# Patient Record
Sex: FEMALE | Race: WHITE | NOT HISPANIC OR LATINO | URBAN - METROPOLITAN AREA
[De-identification: names, ages, dates, MRNs, and addresses within clinical notes are randomized per-mention and may not be internally consistent; named-entity substitution may affect disease eponyms.]

---

## 2024-01-23 ENCOUNTER — OFFICE VISIT (OUTPATIENT)
Dept: URGENT CARE | Facility: CLINIC | Age: 39
End: 2024-01-23
Payer: COMMERCIAL

## 2024-01-23 ENCOUNTER — APPOINTMENT (OUTPATIENT)
Dept: RADIOLOGY | Facility: CLINIC | Age: 39
End: 2024-01-23
Payer: COMMERCIAL

## 2024-01-23 VITALS — TEMPERATURE: 97.2 F | WEIGHT: 132 LBS | HEART RATE: 95 BPM | OXYGEN SATURATION: 100 % | RESPIRATION RATE: 12 BRPM

## 2024-01-23 DIAGNOSIS — S39.92XA TAILBONE INJURY, INITIAL ENCOUNTER: ICD-10-CM

## 2024-01-23 DIAGNOSIS — S39.92XA TAILBONE INJURY, INITIAL ENCOUNTER: Primary | ICD-10-CM

## 2024-01-23 PROCEDURE — 72220 X-RAY EXAM SACRUM TAILBONE: CPT

## 2024-01-23 PROCEDURE — 99213 OFFICE O/P EST LOW 20 MIN: CPT | Performed by: PREVENTIVE MEDICINE

## 2024-01-23 RX ORDER — ESCITALOPRAM OXALATE 20 MG/1
TABLET ORAL
COMMUNITY
Start: 2023-10-23

## 2024-01-23 RX ORDER — OMEPRAZOLE 20 MG/1
20 CAPSULE, DELAYED RELEASE ORAL DAILY
COMMUNITY

## 2024-01-23 NOTE — PROGRESS NOTES
[facility]        NAME: Kasia Barker is a 38 y.o. female  : 1985    MRN: [id number]  DATE: 2024  TIME: 10:07 AM    Assessment and Plan   Tailbone injury, initial encounter [S39.92XA]  1. Tailbone injury, initial encounter  XR sacrum and coccyx            Patient Instructions       Follow up with PCP in 3-5 days.  Proceed to  ER if symptoms worsen.    Chief Complaint     Chief Complaint   Patient presents with    Tailbone Pain     Pt presents with tailbone injury two weeks ago r/t slipping on steps         History of Present Illness       She fell 10 days ago landing on her tailbone.  It is still painful particularly when she walks or stands.        Review of Systems   Review of Systems   Musculoskeletal:  Positive for arthralgias.         Current Medications       Current Outpatient Medications:     escitalopram (LEXAPRO) 20 mg tablet, , Disp: , Rfl:     omeprazole (PriLOSEC) 20 mg delayed release capsule, Take 20 mg by mouth daily, Disp: , Rfl:     Current Allergies     Allergies as of 2024    (No Known Allergies)            The following portions of the patient's history were reviewed and updated as appropriate: allergies, current medications, past family history, past medical history, past social history, past surgical history and problem list.     Past Medical History:   Diagnosis Date    GERD (gastroesophageal reflux disease)     Panic disorder        Past Surgical History:   Procedure Laterality Date    ADENOIDECTOMY      ASD REPAIR N/A     DILATION AND CURETTAGE OF UTERUS N/A     EYE SURGERY      TONSILLECTOMY         History reviewed. No pertinent family history.      Medications have been verified.        Objective   Pulse 95   Temp (!) 97.2 °F (36.2 °C)   Resp 12   Wt 59.9 kg (132 lb)   LMP 2024 (Exact Date)   SpO2 100%   Patient's last menstrual period was 2024 (exact date).       Physical Exam     Physical Exam  Musculoskeletal:      Comments:  Tenderness over the coccyx area.     X-ray reveals no acute changes

## 2024-05-29 ENCOUNTER — OFFICE VISIT (OUTPATIENT)
Dept: URGENT CARE | Facility: CLINIC | Age: 39
End: 2024-05-29
Payer: COMMERCIAL

## 2024-05-29 VITALS
SYSTOLIC BLOOD PRESSURE: 116 MMHG | OXYGEN SATURATION: 98 % | HEART RATE: 90 BPM | HEIGHT: 69 IN | TEMPERATURE: 97.5 F | BODY MASS INDEX: 21.92 KG/M2 | WEIGHT: 148 LBS | RESPIRATION RATE: 18 BRPM | DIASTOLIC BLOOD PRESSURE: 76 MMHG

## 2024-05-29 DIAGNOSIS — L03.012 ACUTE PARONYCHIA OF FINGER OF LEFT HAND: Primary | ICD-10-CM

## 2024-05-29 PROCEDURE — 99213 OFFICE O/P EST LOW 20 MIN: CPT | Performed by: FAMILY MEDICINE

## 2024-05-29 RX ORDER — AMOXICILLIN AND CLAVULANATE POTASSIUM 875; 125 MG/1; MG/1
1 TABLET, FILM COATED ORAL EVERY 12 HOURS SCHEDULED
Qty: 14 TABLET | Refills: 0 | Status: SHIPPED | OUTPATIENT
Start: 2024-05-29 | End: 2024-06-05

## 2024-05-29 NOTE — PROGRESS NOTES
[facility]        NAME: Kasia Barker is a 39 y.o. female  : 1985    MRN: [id number]  DATE: May 29, 2024  TIME: 9:47 AM    Assessment and Plan   Acute paronychia of finger of left hand [L03.012]  1. Acute paronychia of finger of left hand  amoxicillin-clavulanate (AUGMENTIN) 875-125 mg per tablet        Patient Instructions     Patient Instructions   Augmentin x 7 days prescribed, complete as directed.  Take Tylenol or Motrin as needed for pain.   Gently wash the skin with soap and water daily, keep clean.   Apply topical Neosporin ointment to the site and cover w/ bandaid.  Advised warm water soaks 1-2 times per day.  If symptoms persist despite treatment, follow up w/ PCP office for re-check.    Follow up with PCP in 3-5 days.  Proceed to  ER if symptoms worsen.    If tests have been performed at [facility], our office will contact you with results if changes need to be made to the care plan discussed with you at the visit.  You can review your full results on [facility] MyChart.    Chief Complaint     Chief Complaint   Patient presents with    Hand Pain     Left index finger pain and redness around the nail x 1 wk.     History of Present Illness     40 yo female presents c/o pain, swelling, and redness of the skin surrounding the nail on the L index finger. Symptoms have been present for the past 1 week. Patient states the area has drained pus intermittently over the past few days. There is no active drainage at this time. No fevers. No bone or joint tenderness. Patient is able to move the finger in full ROM w/o any pain. No numbness/tingling or weakness of the hand. No red streaking noted along the hand or arm. Patient has taken Ibuprofen and applied tea tree oil to the sites. She has no known allergies. She denies any chance of pregnancy at this time.      Review of Systems   Review of Systems   Constitutional: Negative.    Musculoskeletal: Negative.    Skin:         As noted in HPI  "  Allergic/Immunologic: Negative.    Hematological: Negative.          Current Medications       Current Outpatient Medications:     amoxicillin-clavulanate (AUGMENTIN) 875-125 mg per tablet, Take 1 tablet by mouth every 12 (twelve) hours for 7 days, Disp: 14 tablet, Rfl: 0    escitalopram (LEXAPRO) 20 mg tablet, , Disp: , Rfl:     omeprazole (PriLOSEC) 20 mg delayed release capsule, Take 20 mg by mouth daily, Disp: , Rfl:     Current Allergies     Allergies as of 05/29/2024    (No Known Allergies)            The following portions of the patient's history were reviewed and updated as appropriate: allergies, current medications, past family history, past medical history, past social history, past surgical history and problem list.     Past Medical History:   Diagnosis Date    GERD (gastroesophageal reflux disease)     Panic disorder        Past Surgical History:   Procedure Laterality Date    ADENOIDECTOMY      ASD REPAIR N/A     DILATION AND CURETTAGE OF UTERUS N/A     EYE SURGERY      TONSILLECTOMY         History reviewed. No pertinent family history.      Medications have been verified.        Objective   /76   Pulse 90   Temp 97.5 °F (36.4 °C)   Resp 18   Ht 5' 9\" (1.753 m)   Wt 67.1 kg (148 lb)   SpO2 98%   BMI 21.86 kg/m²   No LMP recorded. Patient has had an ablation.       Physical Exam     Physical Exam  Vitals and nursing note reviewed.   Constitutional:       General: She is awake. She is not in acute distress.     Appearance: Normal appearance. She is well-developed and well-groomed. She is not ill-appearing, toxic-appearing or diaphoretic.   Cardiovascular:      Rate and Rhythm: Normal rate.      Pulses: Normal pulses.   Pulmonary:      Effort: Pulmonary effort is normal. No tachypnea, accessory muscle usage or respiratory distress.   Musculoskeletal:      Comments: Left index finger: there is localized swelling, erythema, and tenderness of the skin surrounding the nail on the left 2nd " digit. No fluctuant area consistent with a pus pocket. No active drainage noted. No bruising noted. Nail intact. Good capillary refill. No bone/joint tenderness. Digit has full ROM, no pain w/ movement. Sensations intact. No erythematous streaking noted.   Skin:     General: Skin is warm and dry.      Capillary Refill: Capillary refill takes less than 2 seconds.      Coloration: Skin is not pale.      Findings: Erythema present. No bruising, ecchymosis, signs of injury or rash.   Neurological:      Mental Status: She is alert and oriented to person, place, and time. Mental status is at baseline.      Sensory: Sensation is intact.      Motor: Motor function is intact.   Psychiatric:         Mood and Affect: Mood normal.         Behavior: Behavior normal. Behavior is cooperative.         Thought Content: Thought content normal.         Judgment: Judgment normal.

## 2024-05-29 NOTE — PATIENT INSTRUCTIONS
Augmentin x 7 days prescribed, complete as directed.  Take Tylenol or Motrin as needed for pain.   Gently wash the skin with soap and water daily, keep clean.   Apply topical Neosporin ointment to the site and cover w/ bandaid.  Advised warm water soaks 1-2 times per day.  If symptoms persist despite treatment, follow up w/ PCP office for re-check.